# Patient Record
Sex: MALE | Race: BLACK OR AFRICAN AMERICAN | NOT HISPANIC OR LATINO | URBAN - METROPOLITAN AREA
[De-identification: names, ages, dates, MRNs, and addresses within clinical notes are randomized per-mention and may not be internally consistent; named-entity substitution may affect disease eponyms.]

---

## 2017-01-03 ENCOUNTER — LAB (OUTPATIENT)
Dept: LAB | Facility: HOSPITAL | Age: 47
End: 2017-01-03

## 2017-01-03 DIAGNOSIS — D70.9 NEUTROPENIA, UNSPECIFIED TYPE (HCC): ICD-10-CM

## 2017-01-03 LAB
BASOPHILS # BLD AUTO: 0.04 10*3/MM3 (ref 0–0.1)
BASOPHILS NFR BLD AUTO: 1.5 % (ref 0–1.1)
DEPRECATED RDW RBC AUTO: 40.4 FL (ref 37–49)
EOSINOPHIL # BLD AUTO: 0.04 10*3/MM3 (ref 0–0.36)
EOSINOPHIL NFR BLD AUTO: 1.5 % (ref 1–5)
ERYTHROCYTE [DISTWIDTH] IN BLOOD BY AUTOMATED COUNT: 13.1 % (ref 11.7–14.5)
HCT VFR BLD AUTO: 42.7 % (ref 40–49)
HGB BLD-MCNC: 13.7 G/DL (ref 13.5–16.5)
IMM GRANULOCYTES # BLD: 0.01 10*3/MM3 (ref 0–0.03)
IMM GRANULOCYTES NFR BLD: 0.4 % (ref 0–0.5)
LYMPHOCYTES # BLD AUTO: 1.21 10*3/MM3 (ref 1–3.5)
LYMPHOCYTES NFR BLD AUTO: 46.4 % (ref 20–49)
MCH RBC QN AUTO: 27.2 PG (ref 27–33)
MCHC RBC AUTO-ENTMCNC: 32.1 G/DL (ref 32–35)
MCV RBC AUTO: 84.7 FL (ref 83–97)
MONOCYTES # BLD AUTO: 0.3 10*3/MM3 (ref 0.25–0.8)
MONOCYTES NFR BLD AUTO: 11.5 % (ref 4–12)
NEUTROPHILS # BLD AUTO: 1.01 10*3/MM3 (ref 1.5–7)
NEUTROPHILS NFR BLD AUTO: 38.7 % (ref 39–75)
NRBC BLD MANUAL-RTO: 0 /100 WBC (ref 0–0)
PLATELET # BLD AUTO: 221 10*3/MM3 (ref 150–375)
PMV BLD AUTO: 10.8 FL (ref 8.9–12.1)
RBC # BLD AUTO: 5.04 10*6/MM3 (ref 4.3–5.5)
WBC NRBC COR # BLD: 2.61 10*3/MM3 (ref 4–10)

## 2017-01-03 PROCEDURE — 81342 TRG GENE REARRANGEMENT ANAL: CPT

## 2017-01-03 PROCEDURE — 36415 COLL VENOUS BLD VENIPUNCTURE: CPT | Performed by: INTERNAL MEDICINE

## 2017-01-03 PROCEDURE — 85025 COMPLETE CBC W/AUTO DIFF WBC: CPT | Performed by: INTERNAL MEDICINE

## 2017-01-03 PROCEDURE — 81340 TRB@ GENE REARRANGE AMPLIFY: CPT

## 2017-01-09 LAB
REF LAB TEST METHOD: NORMAL
REF LAB TEST METHOD: NORMAL

## 2017-01-16 ENCOUNTER — OFFICE VISIT (OUTPATIENT)
Dept: ONCOLOGY | Facility: CLINIC | Age: 47
End: 2017-01-16

## 2017-01-16 ENCOUNTER — APPOINTMENT (OUTPATIENT)
Dept: LAB | Facility: HOSPITAL | Age: 47
End: 2017-01-16

## 2017-01-16 VITALS
HEART RATE: 67 BPM | BODY MASS INDEX: 26.29 KG/M2 | HEIGHT: 78 IN | DIASTOLIC BLOOD PRESSURE: 72 MMHG | OXYGEN SATURATION: 100 % | SYSTOLIC BLOOD PRESSURE: 128 MMHG | TEMPERATURE: 98.1 F | RESPIRATION RATE: 14 BRPM | WEIGHT: 227.2 LBS

## 2017-01-16 DIAGNOSIS — C91.10 CHRONIC LARGE GRANULAR LYMPHOCYTIC LEUKEMIA (HCC): Primary | ICD-10-CM

## 2017-01-16 PROCEDURE — 99214 OFFICE O/P EST MOD 30 MIN: CPT | Performed by: INTERNAL MEDICINE

## 2017-01-16 PROCEDURE — G0463 HOSPITAL OUTPT CLINIC VISIT: HCPCS | Performed by: INTERNAL MEDICINE

## 2017-01-16 NOTE — PROGRESS NOTES
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      History of Present Illness   Mr. Fierro is a very pleasant 46-year-old -American American man seen today in follow-up for chronic neutropenia which was first noted in 2016.    He feels well.  He denies unusual weight loss, night sweats, or fatigue.  He works out at the gym regularly.  He denies infections and reports no increased infections compared to the average person.  He has had no hospitalizations for infection.  He has good appetite.  He has noted no lymphadenopathy.  He had HIV testing in May of this year which he reports was negative.    His initial visit 8/26/16, CBC was normal except for a neutrophil count of 0.75.  B12 and folic acid levels were normal.  Protein electrophoresis negative for M spike.  And MALU and rheumatoid factor were both negative.  His peripheral blood flow cytometry showed approximately 20% large granular lymphocytes.    We performed a follow-up analysis in December 2016 with peripheral blood flow cytometry again positive for a population of large granular lymphocytes at 14%.  T-cell rearrangement studies were performed and positive for a clonal T-cell beta abnormality.    In follow-up one 1/16/17 he continues to feel well with no infections or constitutional symptoms.    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[employer]     Social History Main Topics   • Smoking status: Never Smoker   • Smokeless tobacco: None  "  • Alcohol use Yes      Comment: Social   • Drug use: No   • Sexual activity: Not Asked     Other Topics Concern   • None     Social History Narrative        Family History   Problem Relation Age of Onset   • Diabetes Father    • Hypertension Father    • Diabetes Brother    • Hypertension Brother    • Stroke Maternal Grandmother    • Pancreatic cancer Mother         Review of Systems   Constitutional: Negative for activity change, appetite change, fatigue, fever and unexpected weight change.   HENT: Negative for nosebleeds and sinus pressure.    Eyes: Negative for visual disturbance.   Respiratory: Negative for chest tightness, shortness of breath and wheezing.    Cardiovascular: Negative for chest pain, palpitations and leg swelling.   Gastrointestinal: Negative for abdominal pain, blood in stool, diarrhea, nausea and vomiting.   Musculoskeletal: Negative for arthralgias, back pain and joint swelling.   Skin: Negative for color change and pallor.   Neurological: Negative for tremors, weakness and headaches.   Hematological: Negative for adenopathy. Does not bruise/bleed easily.        Objective     Vitals:    01/16/17 0753   BP: 128/72   Pulse: 67   Resp: 14   Temp: 98.1 °F (36.7 °C)   TempSrc: Oral   SpO2: 100%   Weight: 227 lb 3.2 oz (103 kg)   Height: 78.35\" (199 cm)   PainSc: 0-No pain     Current Status 1/16/2017   ECOG score 0       Physical Exam   Constitutional: He is oriented to person, place, and time. He appears well-developed and well-nourished. No distress.   Very well appearing, fit AA man   HENT:   Head: Normocephalic.   Eyes: No scleral icterus.   Cardiovascular: Normal rate and regular rhythm.    Pulmonary/Chest: Effort normal. No respiratory distress. He has no wheezes.   Abdominal: Soft. He exhibits no distension and no mass. There is no tenderness.   Musculoskeletal: Normal range of motion. He exhibits no tenderness.   Lymphadenopathy:     He has no cervical adenopathy.     He has no axillary " adenopathy.        Right: No inguinal and no supraclavicular adenopathy present.        Left: No inguinal and no supraclavicular adenopathy present.   Neurological: He is alert and oriented to person, place, and time.   Skin: Skin is warm and dry. No erythema.   Psychiatric: He has a normal mood and affect.   Vitals reviewed.         RECENT LABS:  Hematology WBC   Date Value Ref Range Status   01/03/2017 2.61 (L) 4.00 - 10.00 10*3/mm3 Final   05/31/2016 2.76 (L) 4.50 - 10.70 10*3/mm3 Final   05/31/2016 0-2 (A) None Seen /hpf Final     RBC   Date Value Ref Range Status   01/03/2017 5.04 4.30 - 5.50 10*6/mm3 Final   05/31/2016 5.00 4.60 - 6.00 10*6/mm3 Final     HEMOGLOBIN   Date Value Ref Range Status   01/03/2017 13.7 13.5 - 16.5 g/dL Final   05/31/2016 13.8 13.7 - 17.6 g/dL Final     HEMATOCRIT   Date Value Ref Range Status   01/03/2017 42.7 40.0 - 49.0 % Final   05/31/2016 41.9 40.4 - 52.2 % Final     PLATELETS   Date Value Ref Range Status   01/03/2017 221 150 - 375 10*3/mm3 Final   05/31/2016 201 140 - 500 10*3/mm3 Final      Additional labs as above in HPI    Assessment/Plan     This is a very pleasant 46-year-old -American man presenting with moderate neutropenia of unknown duration but first discovered in 2016.  He has had peripheral blood flow cytometry on 2 separate occasions showing increased large granular lymphocytes between 14 and 20% and moderate neutropenia with ANC typically between 1 and 1.5.  We reviewed today a T-cell rearrangement study which does show a clonal beta receptor consistent with a diagnosis of large granular leukemia.  I again reviewed this diagnosis with the patient today.  He is completely asymptomatic including no recurrent infections or constitutional symptoms and therefore no immediate treatment is required.  I recommended we check his blood twice annually here in the office.  I requested he call immediately if he experiences any significant infections, weight loss,  lymphadenopathy, night sweats, or other concerns.  He should contact a healthcare provider immediately if runs a temperature over 100.5 with low threshold of starting antibiotic therapy in the setting of his chronic neutropenia.

## 2017-01-16 NOTE — LETTER
January 16, 2017     Lluvia Quintero MD  [address]  [facility] [address]  [facility] [address]    Patient: Jv Fierro   YOB: 1970   Date of Visit: 1/16/2017       Dear Dr. Leon MD:    Thank you for referring Jv Fierro to me for evaluation. Below are the relevant portions of my assessment and plan of care.    If you have questions, please do not hesitate to call me. I look forward to following Jv along with you.         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      History of Present Illness   Mr. Fierro is a very pleasant 46-year-old -American American man seen today in follow-up for chronic neutropenia which was first noted in 2016.    He feels well.  He denies unusual weight loss, night sweats, or fatigue.  He works out at the gym regularly.  He denies infections and reports no increased infections compared to the average person.  He has had no hospitalizations for infection.  He has good appetite.  He has noted no lymphadenopathy.  He had HIV testing in May of this year which he reports was negative.    His initial visit 8/26/16, CBC was normal except for a neutrophil count of 0.75.  B12 and folic acid levels were normal.  Protein electrophoresis negative for M spike.  And MALU and rheumatoid factor were both negative.  His peripheral blood flow cytometry showed approximately 20% large granular lymphocytes.    We performed a follow-up analysis in December 2016 with peripheral blood flow cytometry again positive for a population of large granular lymphocytes at 14%.  " T-cell rearrangement studies were performed and positive for a clonal T-cell beta abnormality.    In follow-up one 1/16/17 he continues to feel well with no infections or constitutional symptoms.    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[employer]     Social History Main Topics   • Smoking status: Never Smoker   • Smokeless tobacco: None   • Alcohol use Yes      Comment: Social   • Drug use: No   • Sexual activity: Not Asked     Other Topics Concern   • None     Social History Narrative        Family History   Problem Relation Age of Onset   • Diabetes Father    • Hypertension Father    • Diabetes Brother    • Hypertension Brother    • Stroke Maternal Grandmother    • Pancreatic cancer Mother         Review of Systems   Constitutional: Negative for activity change, appetite change, fatigue, fever and unexpected weight change.   HENT: Negative for nosebleeds and sinus pressure.    Eyes: Negative for visual disturbance.   Respiratory: Negative for chest tightness, shortness of breath and wheezing.    Cardiovascular: Negative for chest pain, palpitations and leg swelling.   Gastrointestinal: Negative for abdominal pain, blood in stool, diarrhea, nausea and vomiting.   Musculoskeletal: Negative for arthralgias, back pain and joint swelling.   Skin: Negative for color change and pallor.   Neurological: Negative for tremors, weakness and headaches.   Hematological: Negative for adenopathy. Does not bruise/bleed easily.        Objective     Vitals:    01/16/17 0753   BP: 128/72   Pulse: 67   Resp: 14   Temp: 98.1 °F (36.7 °C)   TempSrc: Oral   SpO2: 100%   Weight: 227 lb 3.2 oz (103 kg)   Height: 78.35\" (199 cm)   PainSc: 0-No pain     Current Status 1/16/2017   ECOG score 0 "       Physical Exam   Constitutional: He is oriented to person, place, and time. He appears well-developed and well-nourished. No distress.   Very well appearing, fit AA man   HENT:   Head: Normocephalic.   Eyes: No scleral icterus.   Cardiovascular: Normal rate and regular rhythm.    Pulmonary/Chest: Effort normal. No respiratory distress. He has no wheezes.   Abdominal: Soft. He exhibits no distension and no mass. There is no tenderness.   Musculoskeletal: Normal range of motion. He exhibits no tenderness.   Lymphadenopathy:     He has no cervical adenopathy.     He has no axillary adenopathy.        Right: No inguinal and no supraclavicular adenopathy present.        Left: No inguinal and no supraclavicular adenopathy present.   Neurological: He is alert and oriented to person, place, and time.   Skin: Skin is warm and dry. No erythema.   Psychiatric: He has a normal mood and affect.   Vitals reviewed.         RECENT LABS:  Hematology WBC   Date Value Ref Range Status   01/03/2017 2.61 (L) 4.00 - 10.00 10*3/mm3 Final   05/31/2016 2.76 (L) 4.50 - 10.70 10*3/mm3 Final   05/31/2016 0-2 (A) None Seen /hpf Final     RBC   Date Value Ref Range Status   01/03/2017 5.04 4.30 - 5.50 10*6/mm3 Final   05/31/2016 5.00 4.60 - 6.00 10*6/mm3 Final     HEMOGLOBIN   Date Value Ref Range Status   01/03/2017 13.7 13.5 - 16.5 g/dL Final   05/31/2016 13.8 13.7 - 17.6 g/dL Final     HEMATOCRIT   Date Value Ref Range Status   01/03/2017 42.7 40.0 - 49.0 % Final   05/31/2016 41.9 40.4 - 52.2 % Final     PLATELETS   Date Value Ref Range Status   01/03/2017 221 150 - 375 10*3/mm3 Final   05/31/2016 201 140 - 500 10*3/mm3 Final      Additional labs as above in HPI    Assessment/Plan     This is a very pleasant 46-year-old -American man presenting with moderate neutropenia of unknown duration but first discovered in 2016.  He has had peripheral blood flow cytometry on 2 separate occasions showing increased large granular lymphocytes  between 14 and 20% and moderate neutropenia with ANC typically between 1 and 1.5.  We reviewed today a T-cell rearrangement study which does show a clonal beta receptor consistent with a diagnosis of large granular leukemia.  I again reviewed this diagnosis with the patient today.  He is completely asymptomatic including no recurrent infections or constitutional symptoms and therefore no immediate treatment is required.  I recommended we check his blood twice annually here in the office.  I requested he call immediately if he experiences any significant infections, weight loss, lymphadenopathy, night sweats, or other concerns.  He should contact a healthcare provider immediately if runs a temperature over 100.5 with low threshold of starting antibiotic therapy in the setting of his chronic neutropenia.

## 2017-06-15 DIAGNOSIS — Z00.00 HEALTH MAINTENANCE EXAMINATION: Primary | ICD-10-CM

## 2017-06-16 ENCOUNTER — RESULTS ENCOUNTER (OUTPATIENT)
Dept: INTERNAL MEDICINE | Facility: CLINIC | Age: 47
End: 2017-06-16

## 2017-06-16 DIAGNOSIS — Z00.00 HEALTH MAINTENANCE EXAMINATION: ICD-10-CM

## 2017-07-06 ENCOUNTER — APPOINTMENT (OUTPATIENT)
Dept: LAB | Facility: HOSPITAL | Age: 47
End: 2017-07-06

## 2017-07-17 ENCOUNTER — APPOINTMENT (OUTPATIENT)
Dept: LAB | Facility: HOSPITAL | Age: 47
End: 2017-07-17

## 2017-07-17 ENCOUNTER — APPOINTMENT (OUTPATIENT)
Dept: ONCOLOGY | Facility: CLINIC | Age: 47
End: 2017-07-17

## 2017-08-14 ENCOUNTER — APPOINTMENT (OUTPATIENT)
Dept: LAB | Facility: HOSPITAL | Age: 47
End: 2017-08-14

## 2017-08-14 ENCOUNTER — APPOINTMENT (OUTPATIENT)
Dept: ONCOLOGY | Facility: CLINIC | Age: 47
End: 2017-08-14

## 2017-08-28 ENCOUNTER — APPOINTMENT (OUTPATIENT)
Dept: ONCOLOGY | Facility: CLINIC | Age: 47
End: 2017-08-28

## 2017-08-28 ENCOUNTER — APPOINTMENT (OUTPATIENT)
Dept: LAB | Facility: HOSPITAL | Age: 47
End: 2017-08-28

## 2020-12-21 ENCOUNTER — TELEPHONE (OUTPATIENT)
Dept: INTERNAL MEDICINE | Facility: CLINIC | Age: 50
End: 2020-12-21

## 2020-12-21 NOTE — TELEPHONE ENCOUNTER
Left voice mail for patient to return my call and ask for Sharon. So we can schedule a new patient physical with Dr. Lluvia Quintero. Ok to work in Dr. Quintero schedule 30 minute spot.